# Patient Record
Sex: FEMALE | Race: WHITE | URBAN - METROPOLITAN AREA
[De-identification: names, ages, dates, MRNs, and addresses within clinical notes are randomized per-mention and may not be internally consistent; named-entity substitution may affect disease eponyms.]

---

## 2023-03-09 ENCOUNTER — OFFICE VISIT (OUTPATIENT)
Dept: URBAN - METROPOLITAN AREA CLINIC 23 | Facility: CLINIC | Age: 33
End: 2023-03-09
Payer: COMMERCIAL

## 2023-03-09 VITALS
WEIGHT: 147.2 LBS | SYSTOLIC BLOOD PRESSURE: 118 MMHG | HEART RATE: 74 BPM | HEIGHT: 67 IN | DIASTOLIC BLOOD PRESSURE: 74 MMHG | BODY MASS INDEX: 23.1 KG/M2 | TEMPERATURE: 97.2 F

## 2023-03-09 DIAGNOSIS — L29.0 ANAL ITCHING: ICD-10-CM

## 2023-03-09 PROCEDURE — 99204 OFFICE O/P NEW MOD 45 MIN: CPT | Performed by: INTERNAL MEDICINE

## 2023-03-09 RX ORDER — HYDROCORTISONE 25 MG/G
1 APPLICATION CREAM TOPICAL TWICE A DAY
Qty: 1 TUBE | Refills: 3 | OUTPATIENT
Start: 2023-03-09 | End: 2023-05-04

## 2023-03-09 NOTE — EXAM-PHYSICAL EXAM
Per rectum exam: MA present as chaperone. small non-thrombosed external hemorrhoids +. No mass/pain/blood on digital rectal exam (EMILI). yellowish green stool seen on EMILI

## 2023-03-09 NOTE — HPI-TODAY'S VISIT:
32F c/o int hemorrhoids c/o extreme itching in anal area for 2 weeks. it has bothered her in the past but this time it seems more intense. no anal pain she did have diarrhea 2 weeks ago she has used hemorrhoid cream and suppositories which have helped her the diarrhea has pretty much resolved.  BM 2-3 times a day, soft she was on 3-4 antibiotics in nov-dec for URI. she thinks that may have caused the diarrhea no blood in stool. never had colon. no FH of GI cancers. no abd surgery

## 2023-04-07 ENCOUNTER — OFFICE VISIT (OUTPATIENT)
Dept: URBAN - METROPOLITAN AREA CLINIC 25 | Facility: CLINIC | Age: 33
End: 2023-04-07

## 2023-04-07 RX ORDER — HYDROCORTISONE 25 MG/G
1 APPLICATION CREAM TOPICAL TWICE A DAY
Qty: 1 TUBE | Refills: 3 | COMMUNITY
Start: 2023-03-09 | End: 2023-05-04

## 2023-04-13 ENCOUNTER — OFFICE VISIT (OUTPATIENT)
Dept: URBAN - METROPOLITAN AREA CLINIC 50 | Facility: CLINIC | Age: 33
End: 2023-04-13

## 2023-04-13 NOTE — HPI-TODAY'S VISIT:
32F for fu  Patient seen by Dr. Hooper  Was given anusol cream for anal itching   ________________________ c/o extreme itching in anal area for 2 weeks. it has bothered her in the past but this time it seems more intense. no anal pain she did have diarrhea 2 weeks ago she has used hemorrhoid cream and suppositories which have helped her the diarrhea has pretty much resolved.  BM 2-3 times a day, soft she was on 3-4 antibiotics in nov-dec for URI. she thinks that may have caused the diarrhea no blood in stool. never had colon. no FH of GI cancers. no abd surgery

## 2023-05-11 ENCOUNTER — OFFICE VISIT (OUTPATIENT)
Dept: URBAN - METROPOLITAN AREA CLINIC 23 | Facility: CLINIC | Age: 33
End: 2023-05-11

## 2023-05-18 ENCOUNTER — TELEPHONE ENCOUNTER (OUTPATIENT)
Dept: URBAN - METROPOLITAN AREA CLINIC 2 | Facility: CLINIC | Age: 33
End: 2023-05-18

## 2023-05-18 ENCOUNTER — OFFICE VISIT (OUTPATIENT)
Dept: URBAN - METROPOLITAN AREA CLINIC 2 | Facility: CLINIC | Age: 33
End: 2023-05-18
Payer: COMMERCIAL

## 2023-05-18 VITALS — BODY MASS INDEX: 23.32 KG/M2 | WEIGHT: 148.6 LBS | HEIGHT: 67 IN | TEMPERATURE: 98.1 F

## 2023-05-18 DIAGNOSIS — K60.2 ANAL FISSURE: ICD-10-CM

## 2023-05-18 PROCEDURE — 99213 OFFICE O/P EST LOW 20 MIN: CPT | Performed by: NURSE PRACTITIONER

## 2023-05-18 NOTE — PHYSICAL EXAM GASTROINTESTINAL
Abdomen , soft, nontender, nondistended , no guarding or rigidity , no masses palpable , normal bowel sounds , Liver and Spleen , no hepatomegaly present , no hepatosplenomegaly , liver nontender , spleen not palpable Rectal, no external hemorrhoids, anal fissure noted

## 2023-05-18 NOTE — HPI-TODAY'S VISIT:
Very pleasant 32-year-old female seen in follow-up today for history of hemorrhoids.  The steroid cream did not help-made symptoms worse. She denies pain or bleeding. c/o itching.  She ttok picture which noted red bump at top of anus. She is using fiber supplement which has helped to have more formed bowel movements.

## 2023-07-31 ENCOUNTER — OFFICE VISIT (OUTPATIENT)
Dept: URBAN - METROPOLITAN AREA CLINIC 2 | Facility: CLINIC | Age: 33
End: 2023-07-31

## 2023-09-22 ENCOUNTER — OFFICE VISIT (OUTPATIENT)
Dept: URBAN - METROPOLITAN AREA CLINIC 128 | Facility: CLINIC | Age: 33
End: 2023-09-22
Payer: COMMERCIAL

## 2023-09-22 ENCOUNTER — DASHBOARD ENCOUNTERS (OUTPATIENT)
Age: 33
End: 2023-09-22

## 2023-09-22 ENCOUNTER — TELEPHONE ENCOUNTER (OUTPATIENT)
Dept: URBAN - METROPOLITAN AREA CLINIC 128 | Facility: CLINIC | Age: 33
End: 2023-09-22

## 2023-09-22 VITALS
WEIGHT: 156.8 LBS | SYSTOLIC BLOOD PRESSURE: 116 MMHG | TEMPERATURE: 97.9 F | HEART RATE: 80 BPM | BODY MASS INDEX: 24.61 KG/M2 | DIASTOLIC BLOOD PRESSURE: 72 MMHG | HEIGHT: 67 IN

## 2023-09-22 DIAGNOSIS — K64.9 HEMORRHOID: ICD-10-CM

## 2023-09-22 DIAGNOSIS — K60.2 RECTAL FISSURE: ICD-10-CM

## 2023-09-22 PROCEDURE — 99213 OFFICE O/P EST LOW 20 MIN: CPT | Performed by: PHYSICIAN ASSISTANT

## 2023-09-22 RX ORDER — HYDROCORTISONE 25 MG/G
1 APPLICATION CREAM TOPICAL TWICE A DAY
Qty: 1 | Refills: 0 | OUTPATIENT

## 2023-09-22 NOTE — HPI-TODAY'S VISIT:
The patient presents today due to the following symptoms: hemorrhoids vs fissure pain after having BMs Duration of symptoms: x 7 months Associated symptoms: rectal discomfort after having a BM, no rectal bleeding What alleviates symptoms: nitroglycerin- lidocaine in 05/2023 helps What aggravates symptoms: having a BM There is no family history of colon polyps or colon cancer.  Last colonoscopy: never She denies any rectal trauma She was given nitroglycerin-lidocaine cream in 05/2023 which helped and she is starting to take it now again which helps.

## 2023-09-22 NOTE — PHYSICAL EXAM GASTROINTESTINAL
Abdomen , soft, nontender, nondistended , no guarding or rigidity , no masses palpable , normal bowel sounds , Liver and Spleen , no hepatomegaly present Rectal  , normal sphincter tone, no external hemorrhoids, non-bleeding internal hemorrhoid noted, no rectal masses, or bleeding present. Rectal examination was performed with a chaperone present

## 2023-09-25 ENCOUNTER — TELEPHONE ENCOUNTER (OUTPATIENT)
Dept: URBAN - METROPOLITAN AREA CLINIC 128 | Facility: CLINIC | Age: 33
End: 2023-09-25

## 2024-03-04 ENCOUNTER — OV EP (OUTPATIENT)
Dept: URBAN - METROPOLITAN AREA CLINIC 128 | Facility: CLINIC | Age: 34
End: 2024-03-04